# Patient Record
Sex: FEMALE | URBAN - METROPOLITAN AREA
[De-identification: names, ages, dates, MRNs, and addresses within clinical notes are randomized per-mention and may not be internally consistent; named-entity substitution may affect disease eponyms.]

---

## 2024-04-29 ENCOUNTER — APPOINTMENT (RX ONLY)
Dept: URBAN - METROPOLITAN AREA CLINIC 302 | Facility: CLINIC | Age: 34
Setting detail: DERMATOLOGY
End: 2024-04-29

## 2024-04-29 DIAGNOSIS — L94.0 LOCALIZED SCLERODERMA [MORPHEA]: ICD-10-CM | Status: INADEQUATELY CONTROLLED

## 2024-04-29 DIAGNOSIS — L30.9 DERMATITIS, UNSPECIFIED: ICD-10-CM

## 2024-04-29 PROCEDURE — ? PRESCRIPTION MEDICATION MANAGEMENT

## 2024-04-29 PROCEDURE — ? PRESCRIPTION

## 2024-04-29 PROCEDURE — ? COUNSELING

## 2024-04-29 PROCEDURE — ? ORDER TESTS

## 2024-04-29 PROCEDURE — 11104 PUNCH BX SKIN SINGLE LESION: CPT

## 2024-04-29 PROCEDURE — 11105 PUNCH BX SKIN EA SEP/ADDL: CPT

## 2024-04-29 PROCEDURE — ? BIOPSY BY PUNCH METHOD

## 2024-04-29 PROCEDURE — 99204 OFFICE O/P NEW MOD 45 MIN: CPT | Mod: 25

## 2024-04-29 RX ORDER — CLOBETASOL PROPIONATE 0.5 MG/G
CREAM TOPICAL
Qty: 45 | Refills: 2 | Status: ERX | COMMUNITY
Start: 2024-04-29

## 2024-04-29 RX ADMIN — CLOBETASOL PROPIONATE: 0.5 CREAM TOPICAL at 00:00

## 2024-04-29 ASSESSMENT — LOCATION ZONE DERM: LOCATION ZONE: LEG

## 2024-04-29 ASSESSMENT — LOCATION SIMPLE DESCRIPTION DERM: LOCATION SIMPLE: RIGHT THIGH

## 2024-04-29 ASSESSMENT — LOCATION DETAILED DESCRIPTION DERM: LOCATION DETAILED: RIGHT ANTERIOR PROXIMAL THIGH

## 2024-04-29 NOTE — PROCEDURE: BIOPSY BY PUNCH METHOD
Detail Level: Detailed
Was A Bandage Applied: Yes
Punch Size In Mm: 3
Size Of Lesion In Cm (Optional): 0
Depth Of Punch Biopsy: dermis
Biopsy Type: H and E
Anesthesia Type: 1% lidocaine with epinephrine
Anesthesia Volume In Cc: 0.5
Hemostasis: None
Epidermal Sutures: 4-0 Nylon
Wound Care: Petrolatum
Dressing: pressure dressing
Suture Removal: 7 days
Patient Will Remove Sutures At Home?: No
Lab: 6
Path Notes Override (Will Replace All Of The Above Text): Photos in chart
Consent: Verbal consent was obtained and risks were reviewed including but not limited to scarring, infection, bleeding, scabbing, incomplete removal, and allergy to anesthesia.
Post-Care Instructions: I reviewed with the patient in detail post-care instructions. Patient is to keep the biopsy site dry overnight, and then apply bacitracin twice daily until healed. Patient may apply hydrogen peroxide soaks to remove any crusting.
Home Suture Removal Text: Patient was provided a home suture removal kit and will remove their sutures at home.  If they have any questions or difficulties they will call the office.
Notification Instructions: Patient will be notified of biopsy results. However, patient instructed to call the office if not contacted within 2 weeks.
Billing Type: Third-Party Bill
Information: Selecting Yes will display possible errors in your note based on the variables you have selected. This validation is only offered as a suggestion for you. PLEASE NOTE THAT THE VALIDATION TEXT WILL BE REMOVED WHEN YOU FINALIZE YOUR NOTE. IF YOU WANT TO FAX A PRELIMINARY NOTE YOU WILL NEED TO TOGGLE THIS TO 'NO' IF YOU DO NOT WANT IT IN YOUR FAXED NOTE.

## 2024-04-29 NOTE — PROCEDURE: ORDER TESTS
Performing Laboratory: 0
Bill For Surgical Tray: no
Expected Date Of Service: 04/29/2024
Billing Type: Third-Party Bill

## 2024-04-29 NOTE — PROCEDURE: PRESCRIPTION MEDICATION MANAGEMENT
Plan: .\\nRash/discoloration present for about 6-7 months. Pt says she feels like it showed up overnight. Has not changed in size/shape or color. It is primarily itchy but sometimes she feels twinges of pain that almost feel like a bruise. No fever, weight loss, joint pain. She does think she’s bruising easily lately. No significant PMHx, not taking any medications. Aunt had an autoimmune disease of some kind (unsure which one). Pt working on finding a PCP and has not had blood work done in a few years.\\n\\nWill order basic screening labs. Performed 2 biopsies today. Will trial clobetasol x 2 weeks then follow up in office to evaluate response to treatment. Favor morphea based on clinical appearance. May change therapy based on labs/path report
Detail Level: Simple
Render In Strict Bullet Format?: No
Initiate Treatment: Clobetasol 0.05% cream BID x 2 weeks, then follow up in office

## 2024-05-07 ENCOUNTER — RX ONLY (OUTPATIENT)
Age: 34
Setting detail: RX ONLY
End: 2024-05-07

## 2024-05-07 RX ORDER — MUPIROCIN 20 MG/G
OINTMENT TOPICAL
Qty: 22 | Refills: 0 | Status: ERX | COMMUNITY
Start: 2024-05-07

## 2024-05-13 ENCOUNTER — APPOINTMENT (RX ONLY)
Dept: URBAN - METROPOLITAN AREA CLINIC 302 | Facility: CLINIC | Age: 34
Setting detail: DERMATOLOGY
End: 2024-05-13

## 2024-05-13 DIAGNOSIS — L94.0 LOCALIZED SCLERODERMA [MORPHEA]: ICD-10-CM | Status: INADEQUATELY CONTROLLED

## 2024-05-13 DIAGNOSIS — Z48.02 ENCOUNTER FOR REMOVAL OF SUTURES: ICD-10-CM

## 2024-05-13 PROCEDURE — ? MDM - TREATMENT GOALS

## 2024-05-13 PROCEDURE — ? PRESCRIPTION

## 2024-05-13 PROCEDURE — ? SUTURE REMOVAL (GLOBAL PERIOD)

## 2024-05-13 PROCEDURE — ? PRESCRIPTION MEDICATION MANAGEMENT

## 2024-05-13 PROCEDURE — ? ORDER TESTS

## 2024-05-13 PROCEDURE — 99024 POSTOP FOLLOW-UP VISIT: CPT

## 2024-05-13 PROCEDURE — ? COUNSELING

## 2024-05-13 RX ORDER — METHOTREXATE SODIUM 2.5 MG/1
TABLET ORAL
Qty: 8 | Refills: 0 | Status: ERX | COMMUNITY
Start: 2024-05-13

## 2024-05-13 RX ORDER — FOLIC ACID 1 MG/1
TABLET ORAL
Qty: 90 | Refills: 0 | Status: ERX | COMMUNITY
Start: 2024-05-13

## 2024-05-13 RX ADMIN — FOLIC ACID: 1 TABLET ORAL at 00:00

## 2024-05-13 RX ADMIN — METHOTREXATE SODIUM: 2.5 TABLET ORAL at 00:00

## 2024-05-13 ASSESSMENT — LOCATION DETAILED DESCRIPTION DERM
LOCATION DETAILED: RIGHT ANTERIOR PROXIMAL THIGH
LOCATION DETAILED: RIGHT ANTERIOR LATERAL PROXIMAL THIGH

## 2024-05-13 ASSESSMENT — LOCATION ZONE DERM: LOCATION ZONE: LEG

## 2024-05-13 ASSESSMENT — LOCATION SIMPLE DESCRIPTION DERM: LOCATION SIMPLE: RIGHT THIGH

## 2024-05-13 NOTE — PROCEDURE: PRESCRIPTION MEDICATION MANAGEMENT
Render In Strict Bullet Format?: No
Plan: .\\nMorphea is slightly less erythematous and less itchy per patient after 2 weeks of clobetasol. 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 Consulted Dr. Haley regarding this case who is in agreement with treatment plan.\\n\\nStart Methotrexate 10mg once weekly with folic acid. Repeat CBC and CMP in 2 weeks and follow up. May titrate dose depending on how she’s responding\\n\\nFull exam today- no evidence of morphea plaques on any other areas of the body. Patient has linear type morphea, unilateral on right lateral thigh. She does report mild pain with certain knee movements at the distal part of her most inferior morphea plaque. \\n\\nAdvised patient to establish with a PCP and could consider MRI to evaluate extent of morphea and see if it expands to the muscles/tendons. Sometimes PT is indicated if morphea crosses joints and impacts movement/ROM. I would also like her to be seen by a rheumatologist. She is going to look and see who is covered with her insurance and we can place a referral for her to be seen with rheum.
Initiate Treatment: MTX 10mg (take four tabs of the 2.5mg) once weekly. Folic acid 1mg QD on days when not taking MTX
Continue Regimen: Clobetasol 0.05% topical cream- wait 1 week then re-start: apply BID x 2 weeks on, 1 week off, repeat PRN\\nMupirocin on biopsy site 1-2x per day for another day or two
Detail Level: Simple

## 2024-05-13 NOTE — PROCEDURE: SUTURE REMOVAL (GLOBAL PERIOD)
Detail Level: Detailed
Add 96206 Cpt? (Important Note: In 2017 The Use Of 65949 Is Being Tracked By Cms To Determine Future Global Period Reimbursement For Global Periods): yes

## 2024-05-13 NOTE — PROCEDURE: ORDER TESTS
Performing Laboratory: [id number]
Expected Date Of Service: 05/13/2024
Billing Type: Third-Party Bill
Bill For Surgical Tray: no

## 2024-05-30 ENCOUNTER — APPOINTMENT (RX ONLY)
Dept: URBAN - METROPOLITAN AREA CLINIC 302 | Facility: CLINIC | Age: 34
Setting detail: DERMATOLOGY
End: 2024-05-30

## 2024-05-30 DIAGNOSIS — L94.0 LOCALIZED SCLERODERMA [MORPHEA]: ICD-10-CM

## 2024-05-30 PROCEDURE — G2211 COMPLEX E/M VISIT ADD ON: HCPCS

## 2024-05-30 PROCEDURE — ? PRESCRIPTION MEDICATION MANAGEMENT

## 2024-05-30 PROCEDURE — 99214 OFFICE O/P EST MOD 30 MIN: CPT

## 2024-05-30 PROCEDURE — ? PRESCRIPTION

## 2024-05-30 PROCEDURE — ? ORDER TESTS

## 2024-05-30 PROCEDURE — ? MDM - TREATMENT GOALS

## 2024-05-30 PROCEDURE — ? COUNSELING

## 2024-05-30 PROCEDURE — ? VISIT COMPLEXITY

## 2024-05-30 RX ORDER — METHOTREXATE SODIUM 2.5 MG/1
TABLET ORAL
Qty: 20 | Refills: 0 | Status: ERX

## 2024-05-30 ASSESSMENT — LOCATION DETAILED DESCRIPTION DERM
LOCATION DETAILED: RIGHT ANTERIOR LATERAL PROXIMAL THIGH
LOCATION DETAILED: RIGHT ANTERIOR PROXIMAL THIGH

## 2024-05-30 ASSESSMENT — LOCATION ZONE DERM: LOCATION ZONE: LEG

## 2024-05-30 ASSESSMENT — LOCATION SIMPLE DESCRIPTION DERM: LOCATION SIMPLE: RIGHT THIGH

## 2024-05-30 NOTE — PROCEDURE: ORDER TESTS
Performing Laboratory: [id number]
Expected Date Of Service: 05/13/2024
Billing Type: Third-Party Bill
Bill For Surgical Tray: no

## 2024-05-30 NOTE — PROCEDURE: PRESCRIPTION MEDICATION MANAGEMENT
Render In Strict Bullet Format?: No
Plan: .\\nMorphea continuing to improve with clobetasol and methotrexate. Pt had labs done 5/24 (CBC, CMP) and everything is within normal range. Okay to increase dose of MTX to 12.5 mg/week. \\n\\nPlan on follow up in 1 month, also repeat CBC and CMP in 1 month just prior to the appointment. If continuing to improve and no adverse effects, plan to hold at this dose for about 6 months and then we can attempt to taper off the MTX. \\n\\nPt still working on finding a PCP that is in network with her insurance.\\n\\nLast visit 5/13 note: No evidence of morphea plaques on any other areas of the body. Patient has linear type morphea, unilateral on right lateral thigh. She does report mild pain with certain knee movements at the distal part of her most inferior morphea patch.
Modify Regimen: Increase dose of MTX to 12.5mg once weekly (5 tabs of the 2.5mg taken as once weekly dose).
Continue Regimen: Folic acid 1g on days when not taking MTX, Clobetasol 0.05% topical cream-apply BID x 2 weeks on, 1 week off, repeat PRN
Detail Level: Simple

## 2024-06-27 ENCOUNTER — APPOINTMENT (RX ONLY)
Dept: URBAN - METROPOLITAN AREA CLINIC 302 | Facility: CLINIC | Age: 34
Setting detail: DERMATOLOGY
End: 2024-06-27

## 2024-06-27 DIAGNOSIS — L94.0 LOCALIZED SCLERODERMA [MORPHEA]: ICD-10-CM

## 2024-06-27 PROCEDURE — 99214 OFFICE O/P EST MOD 30 MIN: CPT

## 2024-06-27 PROCEDURE — ? PRESCRIPTION MEDICATION MANAGEMENT

## 2024-06-27 PROCEDURE — ? COUNSELING

## 2024-06-27 PROCEDURE — ? MDM - TREATMENT GOALS

## 2024-06-27 PROCEDURE — G2211 COMPLEX E/M VISIT ADD ON: HCPCS

## 2024-06-27 PROCEDURE — ? VISIT COMPLEXITY

## 2024-06-27 PROCEDURE — ? PRESCRIPTION

## 2024-06-27 RX ORDER — METHOTREXATE SODIUM 2.5 MG/1
TABLET ORAL
Qty: 60 | Refills: 1 | Status: ERX

## 2024-06-27 ASSESSMENT — LOCATION SIMPLE DESCRIPTION DERM: LOCATION SIMPLE: RIGHT THIGH

## 2024-06-27 ASSESSMENT — LOCATION ZONE DERM: LOCATION ZONE: LEG

## 2024-06-27 NOTE — PROCEDURE: PRESCRIPTION MEDICATION MANAGEMENT
Render In Strict Bullet Format?: No
Plan: Doing well. Labs look good. Continue at current dosage. Pt planning on moving to [place] at end of Sept. will have her follow up before she leaves and she will find a provider in [place] to continue care.
Continue Regimen: MTX 12.5mg once weekly, Folic acid 1g on days when not taking MTX, Clobetasol 0.05% topical cream-apply BID x 2 weeks on, 1 week off, repeat PRN
Detail Level: Simple

## 2024-09-10 RX ORDER — METHOTREXATE SODIUM 2.5 MG/1
TABLET ORAL
Qty: 60 | Refills: 1 | Status: ERX

## 2024-09-24 RX ORDER — FOLIC ACID 1 MG/1
TABLET ORAL
Qty: 90 | Refills: 0 | Status: ERX

## 2024-09-26 ENCOUNTER — APPOINTMENT (RX ONLY)
Dept: URBAN - METROPOLITAN AREA CLINIC 302 | Facility: CLINIC | Age: 34
Setting detail: DERMATOLOGY
End: 2024-09-26

## 2024-09-26 DIAGNOSIS — L94.0 LOCALIZED SCLERODERMA [MORPHEA]: ICD-10-CM

## 2024-09-26 PROCEDURE — G2211 COMPLEX E/M VISIT ADD ON: HCPCS

## 2024-09-26 PROCEDURE — 99214 OFFICE O/P EST MOD 30 MIN: CPT

## 2024-09-26 PROCEDURE — ? COUNSELING

## 2024-09-26 PROCEDURE — ? VISIT COMPLEXITY

## 2024-09-26 PROCEDURE — ? MDM - TREATMENT GOALS

## 2024-09-26 PROCEDURE — ? PRESCRIPTION MEDICATION MANAGEMENT

## 2024-09-26 ASSESSMENT — LOCATION SIMPLE DESCRIPTION DERM: LOCATION SIMPLE: RIGHT THIGH

## 2024-09-26 ASSESSMENT — LOCATION ZONE DERM: LOCATION ZONE: LEG

## 2024-09-26 NOTE — PROCEDURE: PRESCRIPTION MEDICATION MANAGEMENT
Render In Strict Bullet Format?: No
Plan: Pt reports that a few weeks ago when traveling she experienced severe panic attack and heart palpitations. She has heart monitor on today and appt with cardiologist soon. She is in no acute distress. Question possible adverse rxn to MTX. Will f/u with pt after her cardiology appt but consider decreasing dose or stopping this medication. Overall her morphea is much improved. Pt also had labs done recently with her PCP- she does not have them with her today but she will send us these labs as soon as she can for review.
Continue Regimen: MTX 12.5mg once weekly, Folic acid 1g on days when not taking MTX, Clobetasol 0.05% topical cream-apply BID x 2 weeks on, 1 week off, repeat PRN
Detail Level: Simple

## 2024-12-16 ENCOUNTER — APPOINTMENT (OUTPATIENT)
Dept: URBAN - METROPOLITAN AREA CLINIC 302 | Facility: CLINIC | Age: 34
Setting detail: DERMATOLOGY
End: 2024-12-16

## 2024-12-16 DIAGNOSIS — L94.0 LOCALIZED SCLERODERMA [MORPHEA]: ICD-10-CM | Status: IMPROVED

## 2024-12-16 PROCEDURE — ? VISIT COMPLEXITY

## 2024-12-16 PROCEDURE — ? PRESCRIPTION MEDICATION MANAGEMENT

## 2024-12-16 PROCEDURE — G2211 COMPLEX E/M VISIT ADD ON: HCPCS

## 2024-12-16 PROCEDURE — ? MDM - TREATMENT GOALS

## 2024-12-16 PROCEDURE — 99214 OFFICE O/P EST MOD 30 MIN: CPT

## 2024-12-16 PROCEDURE — ? COUNSELING

## 2024-12-16 ASSESSMENT — LOCATION ZONE DERM: LOCATION ZONE: LEG

## 2024-12-16 ASSESSMENT — LOCATION SIMPLE DESCRIPTION DERM: LOCATION SIMPLE: RIGHT THIGH

## 2024-12-16 NOTE — PROCEDURE: PRESCRIPTION MEDICATION MANAGEMENT
Plan: Morphea significantly improved with MTX. Pt having some other health issues recently- heart palpitations, anxiety, headaches, and numbness. She has seen cardiologist and had heart monitor which found some PVCs but cardiologist was not overly concerned. Saw a neurologist for her neuro sx and had a brain MRI which was normal. Will have pt taper off MTX to see if this may be causing any of her symptoms. She is doing well and I am optimistic we can hopefully maintain her morphea with topical steroids. Pt verbalizes understanding and agrees with treatment plan
Modify Regimen: Decrease MTX to 10mg/week for 3 weeks, then 7.5mg/week for 3 weeks, then discontinue
Render In Strict Bullet Format?: No
Continue Regimen: Clobetasol as prescribed, folic acid daily
Detail Level: Simple